# Patient Record
Sex: FEMALE | Race: WHITE
[De-identification: names, ages, dates, MRNs, and addresses within clinical notes are randomized per-mention and may not be internally consistent; named-entity substitution may affect disease eponyms.]

---

## 2017-09-01 ENCOUNTER — HOSPITAL ENCOUNTER (INPATIENT)
Dept: HOSPITAL 36 - ER | Age: 77
LOS: 3 days | Discharge: HOME | DRG: 100 | End: 2017-09-04
Attending: INTERNAL MEDICINE | Admitting: INTERNAL MEDICINE
Payer: MEDICARE

## 2017-09-01 VITALS — SYSTOLIC BLOOD PRESSURE: 143 MMHG | DIASTOLIC BLOOD PRESSURE: 57 MMHG

## 2017-09-01 DIAGNOSIS — Z87.440: ICD-10-CM

## 2017-09-01 DIAGNOSIS — I10: ICD-10-CM

## 2017-09-01 DIAGNOSIS — K21.9: ICD-10-CM

## 2017-09-01 DIAGNOSIS — K72.90: ICD-10-CM

## 2017-09-01 DIAGNOSIS — Z79.899: ICD-10-CM

## 2017-09-01 DIAGNOSIS — F03.90: ICD-10-CM

## 2017-09-01 DIAGNOSIS — M21.372: ICD-10-CM

## 2017-09-01 DIAGNOSIS — Z88.2: ICD-10-CM

## 2017-09-01 DIAGNOSIS — E78.5: ICD-10-CM

## 2017-09-01 DIAGNOSIS — I25.10: ICD-10-CM

## 2017-09-01 DIAGNOSIS — F29: ICD-10-CM

## 2017-09-01 DIAGNOSIS — J96.01: ICD-10-CM

## 2017-09-01 DIAGNOSIS — E66.2: ICD-10-CM

## 2017-09-01 DIAGNOSIS — J44.9: ICD-10-CM

## 2017-09-01 DIAGNOSIS — G20: ICD-10-CM

## 2017-09-01 DIAGNOSIS — E55.9: ICD-10-CM

## 2017-09-01 DIAGNOSIS — B35.1: ICD-10-CM

## 2017-09-01 DIAGNOSIS — I47.1: ICD-10-CM

## 2017-09-01 DIAGNOSIS — M19.90: ICD-10-CM

## 2017-09-01 DIAGNOSIS — M21.371: ICD-10-CM

## 2017-09-01 DIAGNOSIS — D50.9: ICD-10-CM

## 2017-09-01 DIAGNOSIS — G40.409: Primary | ICD-10-CM

## 2017-09-01 DIAGNOSIS — M62.81: ICD-10-CM

## 2017-09-01 DIAGNOSIS — F20.9: ICD-10-CM

## 2017-09-01 LAB
ALBUMIN/GLOB SERPL: 1.4 {RATIO} (ref 1–1.8)
ALP SERPL-CCNC: 77 U/L (ref 34–104)
ALT SERPL-CCNC: 12 U/L (ref 7–52)
ANION GAP SERPL CALC-SCNC: 14.3 MMOL/L (ref 7–16)
ARTERIAL PATENCY WRIST A: POSITIVE
AST SERPL-CCNC: 16 U/L (ref 13–39)
BASE EXCESS BLDA CALC-SCNC: 4.8 MEQ/L (ref -3–3)
BASOPHILS NFR BLD AUTO: 1.3 % (ref 0–2)
BILIRUB SERPL-MCNC: 0.3 MG/DL (ref 0.3–1)
BUN SERPL-MCNC: 18 MG/DL (ref 7–25)
BUN/CREAT SERPL: 25.7
CALCIUM SERPL-MCNC: 9.3 MG/DL (ref 8.6–10.3)
CHLORIDE SERPL-SCNC: 102 MEQ/L (ref 98–107)
CO2 SERPL-SCNC: 24.5 MEQ/L (ref 21–31)
CREAT SERPL-MCNC: 0.7 MG/DL (ref 0.6–1.2)
CRITICAL VALUES REPORTED BY: (no result)
EOSINOPHIL NFR BLD AUTO: 0.5 % (ref 0–5)
ERYTHROCYTE [DISTWIDTH] IN BLOOD BY AUTOMATED COUNT: 12.8 % (ref 11.5–20)
GLOBULIN SER-MCNC: 2.6 GM/DL
GLUCOSE SERPL-MCNC: 145 MG/DL (ref 70–105)
HCO3 BLDA-SCNC: 28.3 MEQ/L (ref 20–26)
HCT VFR BLD CALC: 44.2 % (ref 35–45)
HGB BLD-MCNC: 14.6 GM/DL (ref 11.7–16.1)
LYMPHOCYTES NFR BLD AUTO: 10.3 % (ref 20–50)
MCH RBC QN AUTO: 30.1 PG (ref 27–31)
MCHC RBC AUTO-ENTMCNC: 33 PG (ref 28–36)
MCV RBC AUTO: 91.2 FL (ref 81–100)
MONOCYTES NFR BLD AUTO: 3 % (ref 2–10)
NEUTROPHILS # BLD: 8.6 TH/CMM (ref 1.8–8)
NEUTROPHILS NFR BLD AUTO: 84.9 % (ref 40–80)
O2/TOTAL GAS SETTING VFR VENT: 21 %
PLATELET # BLD: 257 TH/CMM (ref 150–400)
PMV BLD AUTO: 8.5 FL
POTASSIUM SERPL-SCNC: 3.8 MEQ/L (ref 3.5–5.1)
RBC # BLD AUTO: 4.84 MIL/CMM (ref 3.8–5.2)
SODIUM SERPL-SCNC: 137 MEQ/L (ref 136–145)
WBC # BLD AUTO: 10.2 TH/CMM (ref 4.8–10.8)

## 2017-09-01 PROCEDURE — Z7610: HCPCS

## 2017-09-01 RX ADMIN — IPRATROPIUM BROMIDE AND ALBUTEROL SULFATE SCH ML: .5; 3 SOLUTION RESPIRATORY (INHALATION) at 19:24

## 2017-09-01 RX ADMIN — DEXTROSE AND SODIUM CHLORIDE SCH MLS/HR: 5; .45 INJECTION, SOLUTION INTRAVENOUS at 20:42

## 2017-09-01 NOTE — DIAGNOSTIC IMAGING REPORT
CHEST X-RAY: AP view



INDICATION: Shortness of breath



COMPARISON: 5/20/2015



FINDINGS: No focal consolidation or effusions. Chronic lung changes are

noted. Mild cardiomegaly is noted. Atherosclerosis is noted.

Degenerative changes of the spine and bilateral shoulders are noted.



IMPRESSION:



No focal consolidation or evidence of CHF.



Mild cardiomegaly with atherosclerosis.

## 2017-09-01 NOTE — CONSULTATION
DATE OF CONSULTATION:  09/01/2017



HISTORY OF PRESENT ILLNESS:  A 77-year-old female.  She is in nursing home. 

Seizure, tonic-clonic.  There are no other seizures while here.  There is a

history of seizures.  Last one about a year ago.  The patient on Depakote and

Keppra.  Depakote level very low.  The patient is lying in bed.



PAST MEDICAL HISTORY:  Seizures, dementia, Parkinson's, unclear whether the

primary or secondary to medications.



Hypertension, coronary artery disease, asthma, COPD.



MEDICATIONS:  Per reconciliation.



REVIEW OF SYSTEMS:  Twelve point negative except for above.



PHYSICAL EXAMINATION:

VITAL SIGNS:  Temperature 98.1, blood pressure 120/66, pulse is 84.

NECK:  Supple, no bruits.

HEART:  Sounds S1, S2.

LUNGS:  Clear.

NEUROLOGIC:  Awake, alert.  The patient answers questions, give me her name. but

not much more.  Pupils react to light.  She is able to name simple objects. 

Face immobile.

MOTOR:  She has drift right and left arm, but increased tone, rigidity,

cogwheeling.  Tremor, more on the right than left, resting and action.  Lower

extremity, not much movement.  The patient's reflexes about 1 ____ extremity,

difficult to get the knees and ankles.



IMPRESSION:

1.Seizure, with history of seizures.

2.Encephalopathy.

3.Schizophrenia.

4.Parkinson's.

5.Dementia.

6.Coronary artery disease.

7.Hypertension.



PLAN:  We will give IV Depakote and Keppra.  CT scan, EEG.





DD: 09/01/2017 20:11

DT: 09/01/2017 21:14

JOB# [id number]  [id number]

## 2017-09-01 NOTE — ED PHYSICIAN CHART
Chief Complaint/HPI





- Patient Information


Date Seen:: 09/01/17


Time Seen:: 09:46


Chief Complaint:: SEIZURES


History of Present Illness:: 





THIS IS A 78 YO FEMALE WHO WAS SENT FROM THE NURSING HOME FOR EVALUATION AND 

TREATMENT OF HER SEIZURES DISORDER.  SHE HAD A TONIC CLONIC SEIZURE THAT LAST 

FOR ABOUT 15 SECONDS JUST 1 HOUR PRIOR TO ARRIVAL HERE.  THE LAST SEIZURES SHE 

HAD WAS ABOUT A YEAR AGO. SHE IS CHRONICALLY ILL WITH COPD, CAD, OBESITY AND 

SCHIZOPHRENIA.  


Allergies:: 


 Allergies











Allergy/AdvReac Type Severity Reaction Status Date / Time


 


Sulfa (Sulfonamide AdvReac   Verified 09/01/17 09:37





Antibiotics)     











Vitals:: 


 Vital Signs - 8 hr











  09/01/17





  09:42


 


Temp 98.1 F


 


HR 84


 


RR 16


 


/66


 


O2 Sat % 96











Historian:: EMS, Medical Records


Review:: Nurse's Note Reviewed, Old Chart Reviewed, Transfer documents Reviewed





Review of Systems





- Review of Systems


General/Constitutional: No fever, No chills, No weight loss, No weakness, No 

diaphoresis, No edema, No loss of appetite, Other (THIS PATIENT IS UNABLE TO 

GIVE A REVIEW OF SYSTEMS.)


Skin: No skin lesions, No rash, No bruising


Head: No headache, No light-headedness


Eyes: No loss of vision, No pain, No diplopia


ENT: No earache, No nasal drainage, No sore throat, No tinnitus


Neck: No neck pain, No swelling, No thyromegaly, No stiffness, No mass noted


Cardio Vascular: No chest pain, No palpitations, No PND, No orthopnea, No edema


Pulmonary: No SOB, No cough, No sputum, No wheezing


GI: No nausea, No vomiting, No diarrhea, No pain, No melena, No hematochezia, 

No constipation, No hematemesis


G/U: No dysuria, No frequency, No hematuria


Musculoskeletal: No bone or joint pain, No back pain, No muscle pain


Endocrine: No polyuria, No polydipsia


Psychiatric: No prior psych history, No depression, No anxiety, No suicidal 

ideation


Hematopoietic: No bruising, No lymphadenopathy


Allergic/Immuno: No urticaria, No angioedema


Neurological: No syncope, No focal symptoms, No weakness, No paresthesia, No 

headache, No seizure, No dizziness, No confusion, No vertigo





Past Medical History





- Past Medical History


Obtainable: Yes


Past Medical History: HTN, CAD, Asthma/COPD, Seizures, Dementia, Other (

PARKINSONS'S)


Family History: None


Social History: Non Smoker, No Alcohol, No Drug Use, Care Facility


Surgical History: None


Psychiatricy History: Schizophrenia


Medication: Reviewed





Family Medical History





- Family Member


  ** Mother


History Unknown: Yes





Physical Exam





- Physical Examination


General/Constitutional: Awake, Well-developed, well-nourished, Alert, No 

distress, GCS 15, Non-toxic appearing, Ambulatory


Other Gen/Cons comments:: 





DISORIENTED TIMES FOUR


Head: Atraumatic


Eyes: Lids, conjuctiva normal, PERRL, EOMI


Skin: Nl inspection, No rash, No skin lesions, No ecchymosis, Well hydrated, No 

lymphadenopathy


ENMT: External ears, nose nl, Nasal exam nl, Lips, teeth, gums nl


Neck: Nontender, Full ROM w/o pain, No JVD, No nuchal rigidity, No bruit, No 

mass, No stridor


Respiratory: Nl effort/Exclusion, Clear to Auscultation, No Wheeze/Rhonchi/Rales


Cardio Vascular: RRR, No murmur, gallop, rubs, NL S1 S2


GI: No tenderness/rebounding/guarding, No organomegaly, No hernia, Normal BS's, 

Nondistended, No mass/bruits, No McBurney tenderness


: No CVA tenderness


Extremities: No tenderness or effusion, Full ROM, normal strength in all 

extremities, No edema, Normal digits & nails


Neuro/Psych: DTR's symmetric, Normal sensory exam, Normal motor strength, 

Judgement/insight normal, Normal gait, No focal deficits


Other Neuro/Psych comments:: 





SHE IS DISORIENTED AND LETHARGIC


Misc: normal gait, Normal back, No paraspinal tenderness





Labs/Radiology/EKG Results





- Lab Results


Results: 





 Abnormal Lab Results











  09/01/17 09/01/17 09/01/17





  10:00 10:00 10:00


 


WBC  10.2  D  


 


RBC  4.84  


 


Hgb  14.6  D  


 


Hct  44.2  D  


 


MCV  91.2  


 


MCH  30.1  


 


MCHC Differential  33.0  


 


RDW  12.8  


 


Plt Count  257  


 


MPV  8.5  


 


Neutrophils %  84.9 H  


 


Lymphocytes %  10.3 L  


 


Monocytes %  3.0  


 


Eosinophils %  0.5  


 


Basophils %  1.3  


 


Specimen Source   


 


Sample Site   


 


pH   


 


pCO2   


 


pO2   


 


HCO3   


 


Base Excess   


 


O2 Saturation   


 


Ariel Test   


 


Vent Rate   


 


Inspired O2   


 


Tidal Volume   


 


PEEP   


 


Pressure (ins/psv/peep)   


 


Critical Value   


 


Sodium   137 


 


Potassium   3.8 


 


Chloride   102 


 


Carbon Dioxide   24.5 


 


Anion Gap   14.3 


 


BUN   18 


 


Creatinine   0.7 


 


Est GFR ( Amer)   TNP 


 


Est GFR (Non-Af Amer)   TNP 


 


BUN/Creatinine Ratio   25.7 


 


Glucose   145 H 


 


Calcium   9.3 


 


Total Bilirubin   0.3 


 


AST   16 


 


ALT   12 


 


Alkaline Phosphatase   77 


 


Total Protein   6.3 


 


Albumin   3.7 


 


Globulin   2.6 


 


Albumin/Globulin Ratio   1.4 


 


Valproic Acid    < 10.0 L














  09/01/17





  10:10


 


WBC 


 


RBC 


 


Hgb 


 


Hct 


 


MCV 


 


MCH 


 


MCHC Differential 


 


RDW 


 


Plt Count 


 


MPV 


 


Neutrophils % 


 


Lymphocytes % 


 


Monocytes % 


 


Eosinophils % 


 


Basophils % 


 


Specimen Source  Arterial


 


Sample Site  Right Radial


 


pH  7.36


 


pCO2  56.0 H*


 


pO2  48.0 L*


 


HCO3  28.3 H


 


Base Excess  4.8 H


 


O2 Saturation  82.0 L


 


Ariel Test  POSITIVE


 


Vent Rate  N/A


 


Inspired O2  21


 


Tidal Volume  N/A


 


PEEP  N/A


 


Pressure (ins/psv/peep)  N/A


 


Critical Value  ISAIAH


 


Sodium 


 


Potassium 


 


Chloride 


 


Carbon Dioxide 


 


Anion Gap 


 


BUN 


 


Creatinine 


 


Est GFR ( Amer) 


 


Est GFR (Non-Af Amer) 


 


BUN/Creatinine Ratio 


 


Glucose 


 


Calcium 


 


Total Bilirubin 


 


AST 


 


ALT 


 


Alkaline Phosphatase 


 


Total Protein 


 


Albumin 


 


Globulin 


 


Albumin/Globulin Ratio 


 


Valproic Acid 














- Radiology Results


Results: 





CHEST X-RAY = NAD





- EKG Interpretations


EKG Time:: 09:54


Rate & Rhythm: RATE=92 NSR


Axis: LEFT





Assessment





- Assessment


General Assessment: 





SEIZURE DISORDER


HYPOXEMIA





ED Septic Shock





- .


Is Septic Shock (SBP<90, OR Lactate>4 mmol\L) present?: No





- <6hrs of presentation:


Vital Signs: 


 Vital Signs - 8 hr











  09/01/17





  09:42


 


Temp 98.1 F


 


HR 84


 


RR 16


 


/66


 


O2 Sat % 96














Reassessment (Disposition)





- Diagnosis


Diagnosis:: 





SEIZURES


HYPOXEMIA


SCHIZOPHRENIA





- Patient Disposition


Discharge/Transfer:: Acute Care w/in this hosp


Admitted to:: Telemetry


Admitting Medical Physician:: Oliver Elizabeth


Condition at Disposition:: Improved





ED Discharge Plan





- Patient Disposition


Admit/Discharge/Transfer: Acute Care w/in this hosp


Condition at Disposition: Improved

## 2017-09-01 NOTE — HISTORY & PHYSICAL
ADMIT DATE:  09/01/2017



PATIENT IDENTIFICATION:  A 77-year-old female.



CHIEF COMPLAINT:  Brought in to the Emergency Room for altered mental status and

seizure evaluation.



HISTORY SOURCE:  Reviewing the chart, talking to the Emergency Room MD as well

as ICU nurse Allison.



HISTORY OF PRESENT ILLNESS:  A 77-year-old  female.  She resides at

[facility] under the care of primary care doctor, Dr. Posada,

was brought in to the Emergency Room for evaluation of altered mental status

with the seizure.  According to the chart, the patient does have history of

seizure disorder and she is supposed to take Depakote as well as the Keppra. 

Depakote level here in the hospital Emergency Room was reported less than 10. 

The patient was given p.o. Depakote here in the Emergency Room, but subsequently

the patient was admitted to telemetry unit where the patient had another

seizure.  The patient was transferred to ICU for altered mental status and

seizure and the patient was hypoxic.  Upon my arrival, the patient came back

from her postictal phase and started talking.  According to the nursing staff,

the patient had a fever of 100.6.  The patient is alert, awake, and trying to

appropriately answer the questions.



PAST MEDICAL HISTORY:  Remarkable for;

1.  COPD.

2.  Seizure.

3.  Hypertension.

4.  Obesity.

5.  Gastroesophageal reflux disease.

6.  Hyper ammonia level.

7.  Psychotic disorder.

8.  Urinary tract infection.



MEDICATIONS:  At the nursing home, the patient is taking multiple medications,

which include metoprolol, ____, Keppra, Depakote, Colace, clonidine, vitamin D,

atorvastatin, milk of magnesia, Tylenol.



ALLERGIES:  The patient is allergic to SULFA.



SOCIAL HISTORY:  The patient lives currently in a nursing home.  The patient has

no smoking, alcohol or drug use.



FAMILY HISTORY:  Unavailable.



REVIEW OF SYSTEMS:  The patient denies any headache.  Denies any chest pain,

denies any shortness of breath, denies any abdominal pain.  Denies any

hematemesis, hematuria, hematochezia, or melena.  The patient denies any cough.



PHYSICAL EXAMINATION:

GENERAL:  The patient is alert, awake, lying in the bed, following commands.

VITAL SIGNS:  Temperature 100.1, pulse is 120, respiratory rate 23, blood

pressure 150/73.

HEENT:  Normocephalic, atraumatic.  Extraocular muscles are intact.  Tongue was

pink and coated.  Oropharynx congested.  Nasal mucosa congested.

NECK:  Supple.  No JVD, no hepatojugular reflex.  No lymphadenopathy,

thyromegaly or carotid bruit.

HEART:  Both heart sounds are regular, tachycardia noted.

CHEST AND LUNGS:  Equal in expansion with mild expiratory wheezing.

ABDOMEN:  Protuberant, soft.  No guarding, rigidity.  Bowel sounds are present.

EXTREMITIES:  Bilateral foot drop noted with no calf tenderness.

NEUROLOGIC:  Alert, awake, follows commands.  Decreased power on the lower

extremity noted which includes flexion, extension of proximal and distal muscles

and no movement on both feet noted.  Upper extremities, the patient is moving

fairly well.



AVAILABLE DIAGNOSTIC DATA:  Performed in the Emergency Room, which include white

count of 10.2, hemoglobin 14.6, platelet count of 257, pH of 7.36, pCO2 of 56,

pO2 of 48, bicarbonate of 28.3.  BUN and creatinine are 18 and 0.7. 

Electrolytes are normal.  Random blood sugar 145.  Depakote level is less than

10, liver functions are normal.



CLINICAL IMPRESSION:

1.  Most likely breakthrough seizure.

2.  Hypoxia, secondary to central etiology and possible obstructive sleep apnea,

obesity hypoventilation syndrome.

3.  Hypertension.

4.  Breakthrough seizure secondary to subtherapeutic Depakote and Keppra level.

5.  Hepatic encephalopathy by history.

6.  Psychotic disorder.

7.  Gastroesophageal reflux disease.

8.  Morbid obesity.

9.  Chronic obstructive pulmonary disease.



PLAN:  The patient is admitted at this time to ICU, oxygen, nebulizer treatment

will be added, empirically IV Rocephin will be added.  Neurology consultation

will be requested, continue to provide seizure precautions along with IV

Depakote and Keppra for now.  At also get the baseline EEG as well.  The patient

was noted to have heart rate of 125.  The patient was seen by cardiologist, will

follow his recommendation as well.  Cardiac enzymes will be done.  Neuro check

will be done as well as for follow up lab.  Care plan has been reviewed and

discussed with the patient and the RN.





DD: 09/01/2017 17:24

DT: 09/01/2017 19:54

JOB# [id number]  [id number]

## 2017-09-02 LAB
ALBUMIN/GLOB SERPL: 1.5 {RATIO} (ref 1–1.8)
ALP SERPL-CCNC: 55 U/L (ref 34–104)
ALT SERPL-CCNC: 10 U/L (ref 7–52)
ANION GAP SERPL CALC-SCNC: 6.6 MMOL/L (ref 7–16)
AST SERPL-CCNC: 12 U/L (ref 13–39)
BILIRUB SERPL-MCNC: 0.5 MG/DL (ref 0.3–1)
BUN SERPL-MCNC: 11 MG/DL (ref 7–25)
BUN/CREAT SERPL: 15.7
CALCIUM SERPL-MCNC: 8.7 MG/DL (ref 8.6–10.3)
CHLORIDE SERPL-SCNC: 103 MEQ/L (ref 98–107)
CHOLEST SERPL-MCNC: 144 MG/DL (ref ?–200)
CO2 SERPL-SCNC: 31.3 MEQ/L (ref 21–31)
CREAT SERPL-MCNC: 0.7 MG/DL (ref 0.6–1.2)
ERYTHROCYTE [DISTWIDTH] IN BLOOD BY AUTOMATED COUNT: 12.5 % (ref 11.5–20)
GLOBULIN SER-MCNC: 2.1 GM/DL
GLUCOSE SERPL-MCNC: 105 MG/DL (ref 70–105)
HCT VFR BLD CALC: 37.6 % (ref 35–45)
HGB BLD-MCNC: 12.4 GM/DL (ref 11.7–16.1)
MCH RBC QN AUTO: 30.2 PG (ref 27–31)
MCHC RBC AUTO-ENTMCNC: 33.1 PG (ref 28–36)
MCV RBC AUTO: 91.3 FL (ref 81–100)
NEUTROPHILS NFR BLD AUTO: 81 % (ref 40–80)
PLAT MORPH BLD: NORMAL
PLATELET # BLD EST: ADEQUATE 10*3/UL
PLATELET # BLD: 221 TH/CMM (ref 150–400)
PMV BLD AUTO: 8.3 FL
POTASSIUM SERPL-SCNC: 3.9 MEQ/L (ref 3.5–5.1)
RBC # BLD AUTO: 4.11 MIL/CMM (ref 3.8–5.2)
RBC MORPH BLD: NORMAL
SODIUM SERPL-SCNC: 137 MEQ/L (ref 136–145)
TOTAL CELLS COUNTED BLD: 100
TRIGL SERPL-MCNC: 78 MG/DL (ref ?–150)
WBC # BLD AUTO: 12.2 TH/CMM (ref 4.8–10.8)

## 2017-09-02 RX ADMIN — IPRATROPIUM BROMIDE AND ALBUTEROL SULFATE SCH ML: .5; 3 SOLUTION RESPIRATORY (INHALATION) at 18:41

## 2017-09-02 RX ADMIN — LEVETIRACETAM SCH MLS/HR: 10 INJECTION INTRAVENOUS at 15:58

## 2017-09-02 RX ADMIN — NYSTATIN SCH UNITS: 100000 POWDER TOPICAL at 10:45

## 2017-09-02 RX ADMIN — DEXTROSE AND SODIUM CHLORIDE SCH MLS/HR: 5; .45 INJECTION, SOLUTION INTRAVENOUS at 15:59

## 2017-09-02 RX ADMIN — IPRATROPIUM BROMIDE AND ALBUTEROL SULFATE SCH ML: .5; 3 SOLUTION RESPIRATORY (INHALATION) at 12:09

## 2017-09-02 RX ADMIN — IPRATROPIUM BROMIDE AND ALBUTEROL SULFATE SCH: .5; 3 SOLUTION RESPIRATORY (INHALATION) at 07:51

## 2017-09-02 RX ADMIN — IPRATROPIUM BROMIDE AND ALBUTEROL SULFATE SCH ML: .5; 3 SOLUTION RESPIRATORY (INHALATION) at 01:46

## 2017-09-02 RX ADMIN — NYSTATIN SCH UNITS: 100000 POWDER TOPICAL at 18:30

## 2017-09-02 RX ADMIN — LEVETIRACETAM SCH MLS/HR: 10 INJECTION INTRAVENOUS at 03:50

## 2017-09-02 NOTE — CONSULTATION
DATE OF CONSULTATION:  09/01/2017



The patient of Dr. Elizabeth.



HISTORY AND PHYSICAL:  This 77-year-old  female patient, who was

transferred from [facility] because of altered level of consciousness as

well as seizure activity.  The patient was admitted to telemetry bed where the

patient had repeat seizures and the patient was transferred to the intensive

care unit.  The patient had supraventricular tachycardia.  The patient was

started on Cardizem drip.



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

obesity, COPD, Parkinson's disease, hypertension, iron deficiency anemia,

hyperlipidemia, vitamin D deficiency, onychomycosis of the toenails,

schizophrenia, hepatic encephalopathy.



FAMILY HISTORY:  Unremarkable.



SOCIAL HISTORY:  No history of smoking, alcohol abuse.



ALLERGIES:  No known allergies.



PHYSICAL EXAMINATION:

VITAL SIGNS:  Blood pressure 130/82, pulse 120 regular, respirations 28.

HEAD:  Normocephalic.  No lumps or bumps.

EYES:  Pupils are equal, reactive to light.  Fundi show AV nicking, sclerae

white, conjunctivae pink.

NECK:  Carotid 2+.  Normal upstroke.  JVD flat.  Thyroid not palpable.  Lymph

nodes not palpable.

CHEST:  Shows increased AP diameter.  No kyphosis, scoliosis.

LUNGS:  Bilateral bronchovesicular breath sounds.

HEART:  PMI fifth intercostal space with lateral to midclavicular line.  S1, S2.

 No S3, S4.  Systolic murmur, grade 2/6, lower left sternal border without

radiation.

ABDOMEN:  Soft.  Liver and spleen are not palpable.  No organomegaly.  Bowel

sounds are active.

NEUROLOGIC:  Seizure activity.

EXTREMITIES:  Peripheral pulses 2+.  No pedal edema.



CLINICAL IMPRESSION:  Grand mal seizures, supraventricular tachycardia, hepatic

encephalopathy, morbid obesity, chronic obstructive pulmonary disease,

Parkinson's disease, hypertension, iron deficiency anemia, hyperlipidemia,

vitamin D deficiency, onychomycosis of toenails, schizophrenia.



PLAN:  The patient will continue on IV Ativan, neurology consult, and start the

patient on Cardizem drip, also get an echocardiogram.





DD: 09/01/2017 22:41

DT: 09/02/2017 01:11

JOB# [id number]  [id number]

## 2017-09-02 NOTE — DIAGNOSTIC IMAGING REPORT
Head CT without intravenous contrast



Indication: CVA



Comparison: 3/24/2015 



Technique: Axial images were obtained from the vertex to the skull base

without IV contrast. Coronal reconstructions were made.   Total DLP:

703,  [id number]



FINDINGS:



Images of the brain obtained without contrast demonstrate no acute

hemorrhage. No mass lesions identified. The ventricles and basal

cisterns are patent.  The gray-white matter differentiation is

preserved. There is no mass effect or midline shift. Atrophy is noted.

Atherosclerosis is noted.



No skull fractures identified. No soft tissue swelling. The paranasal

sinuses are clear.



IMPRESSION:



No CT evidence of acute intracranial abnormality. Clinically indicated a

follow-up MRI may also be obtained



Atrophy.



Atherosclerotic vascular disease.

## 2017-09-03 RX ADMIN — IPRATROPIUM BROMIDE AND ALBUTEROL SULFATE SCH: .5; 3 SOLUTION RESPIRATORY (INHALATION) at 13:54

## 2017-09-03 RX ADMIN — IPRATROPIUM BROMIDE AND ALBUTEROL SULFATE SCH ML: .5; 3 SOLUTION RESPIRATORY (INHALATION) at 06:45

## 2017-09-03 RX ADMIN — IPRATROPIUM BROMIDE AND ALBUTEROL SULFATE SCH ML: .5; 3 SOLUTION RESPIRATORY (INHALATION) at 18:51

## 2017-09-03 RX ADMIN — NYSTATIN SCH UNITS: 100000 POWDER TOPICAL at 18:22

## 2017-09-03 RX ADMIN — DEXTROSE AND SODIUM CHLORIDE SCH MLS/HR: 5; .45 INJECTION, SOLUTION INTRAVENOUS at 18:21

## 2017-09-03 RX ADMIN — IPRATROPIUM BROMIDE AND ALBUTEROL SULFATE SCH: .5; 3 SOLUTION RESPIRATORY (INHALATION) at 01:11

## 2017-09-03 RX ADMIN — LEVETIRACETAM SCH MLS/HR: 10 INJECTION INTRAVENOUS at 03:01

## 2017-09-03 RX ADMIN — NYSTATIN SCH UNITS: 100000 POWDER TOPICAL at 13:01

## 2017-09-04 RX ADMIN — IPRATROPIUM BROMIDE AND ALBUTEROL SULFATE SCH ML: .5; 3 SOLUTION RESPIRATORY (INHALATION) at 13:54

## 2017-09-04 RX ADMIN — IPRATROPIUM BROMIDE AND ALBUTEROL SULFATE SCH ML: .5; 3 SOLUTION RESPIRATORY (INHALATION) at 07:23

## 2017-09-04 RX ADMIN — NYSTATIN SCH: 100000 POWDER TOPICAL at 09:07

## 2017-09-04 RX ADMIN — IPRATROPIUM BROMIDE AND ALBUTEROL SULFATE SCH ML: .5; 3 SOLUTION RESPIRATORY (INHALATION) at 01:11

## 2017-09-04 NOTE — DISCHARGE SUMMARY
DATE OF DISCHARGE:  09/04/2017



IDENTIFICATION:  A 77-year-old female.



PRINCIPAL DIAGNOSES:

1.  Altered mental status secondary to breakthrough seizure.

2.  Hypoxia, most resolved, suspected probably from seizure causing her to have

hypoventilation syndrome due to her obesity.

3.  Hypertension.

4.  Obstructive sleep apnea.

5.  Psychotic disorder.

6.  Gastroesophageal reflux disease.

7.  Chronic obstructive pulmonary disease.

8.  History of hepatic encephalopathy.

9.  Supraventricular tachycardia.



BRIEF TREATMENT FOR THE REASON FOR ADMISSION:  A 77-year-old female who

presented to the Emergency Room for altered mental status and seizure

evaluation.  When the patient was in the ER, the patient was also noted to have

significant hypoxia.  The patient was evaluated and subsequently admitted to

ICU.  Please refer to my dictated medical H and P for further information.



HOSPITAL COURSE:  The patient was admitted to ICU.  The patient was given

oxygen, nebulizer treatment along with empiric IV antibiotics.  Seizure

medication levels were checked.  IV Depakene and Keppra were given.  The patient

had Neurology and Cardiology evaluation since patient's heart rate was 130 as

well.  The patient was noted to have SVT by cardiologist.  Cardiac enzymes were

done, which were negative.  The patient did respond fairly well to IV Depakene

and Keppra.  The patient's mental status improved significantly.  EEG were done,

which was unremarkable for any abnormal activity.  CT scan of the head was done

on 09/02/2017, which was read by radiologist.  No CT evidence of any acute

intracranial abnormality, atrophy or atherosclerotic vascular disease was

reported.  The patient was stabilized and transferred to telemetry and from

telemetry to Med/Surg floor.  There was no reported seizures were given.  IV

Depakene and IV Keppra were switched to p.o. by mouth.  The patient's other

medications were continued ____ the patient was receiving.  The patient remained

hemodynamically stable and no further seizure activity was reported.  Decision

was made that the patient is to be discharged back to [facility].  The patient is discharged to [facility] in stable

condition where the patient will be followed by her primary care MD as well.





DD: 09/04/2017 12:49

DT: 09/04/2017 13:34

JOB# [id number]  [id number]

## 2017-09-06 NOTE — ADMIT CRITERIA FORM
Admit Criteria Forms





- Admit Criteria


Diagnosis: 





                                              SEIZURE





Clinical Indications for Admission to Inpatient Care





                                                         (Place 'X' for any and 

all applicable criteria): 


   


Admission is indicated for seizure and 1 or more of the following (1)(2)(3)(4)(5

)(6) 


[ X]I.    Inpatient admission required rather than observation care (Also use 

Seizure: Observation 


        Care Criteria as appropriate) because of 1 or more of the following:   

      


               [ ]1)   Altered mental status that is severe or persistent


               [ ]2)   New focal neurologic deficit that is severe or persistent


               [ ]3)   Metabolic disorder (eg, hypoglycemia, hyponatremia) that 

is severe or persistent


               [ ]4)   Recurrent seizure


               [X ]5)   Outpatient antiseizure regimen cannot be established (eg

, patient cannot tolerate 


                        medication, initiation requires inpatient care)


               [X ]6)   Need for ongoing intravenous infusion of anti-seizure 

medication 


               [ ]7)   Cerebral bleeding, hydrocephalus, or vasospasm 

monitoring  


               [ ]8)       Increased intracranial pressure or cerebral edema 

monitoring 


               [ ]9)    Other conditions, treatment or monitoring requiring 

inpatient admission 


[ ]II.   Status epilepticus [A] or repetitive seizures not controlled with 

emergent treatment (6)(8)


[ ]III.  Brain disorder (eg, tumor, edema, and hydrocephalus) that requiring 

monitoring or intervention 


         available only at inpatient level of care.


[ ]IV.  Brain insult (eg, severe trauma, stroke, drug toxicity, or withdrawal) 

that requires monitoring 


         or intervention available only at inpatient level of care (10)(11)


[ ]V.   Cardiac arrhythmias of immediate concern 











Extended stay beyond goal length of stay may be needed for (22)


[ ]a)   Complications of status epilepticus


[ ]b)   Refractory status epilepticus


[ ]c)   Etiology-specific therapy for conditions such as CNS infection, head 

injury,eclampsia, 


         severe metabolic abnormalities, and brain tumor


[ ]d)   Residual neurologic damage,


[ ]e)   Initiation of significant change to anticonvulsant treatment 


[ ]f)    Older patients (65 years or older)


[ ]g)   Patient requiring intubation (eg, to protect airway)











The original [facility] content created by [facility] [employer] has been revised. 


The portions of the content which have been revised are identified through the 

use of italic text or in bold, and [facility] [employer] 


has neither reviewed nor approved the modified material. All other unmodified 

content is copyright  [facility].





Please see references footnoted in the original [facility] edition 

2017


Admit Criteria Met?: Yes

## 2019-05-29 ENCOUNTER — HOSPITAL ENCOUNTER (INPATIENT)
Dept: HOSPITAL 36 - ER | Age: 79
LOS: 3 days | Discharge: NURSING, REHAB OR LONG-TERM CARE | DRG: 101 | End: 2019-06-01
Attending: FAMILY MEDICINE | Admitting: FAMILY MEDICINE
Payer: MEDICARE

## 2019-05-29 VITALS — SYSTOLIC BLOOD PRESSURE: 145 MMHG | DIASTOLIC BLOOD PRESSURE: 54 MMHG

## 2019-05-29 DIAGNOSIS — G40.909: Primary | ICD-10-CM

## 2019-05-29 DIAGNOSIS — F02.80: ICD-10-CM

## 2019-05-29 DIAGNOSIS — Z88.2: ICD-10-CM

## 2019-05-29 DIAGNOSIS — G47.33: ICD-10-CM

## 2019-05-29 DIAGNOSIS — E78.5: ICD-10-CM

## 2019-05-29 DIAGNOSIS — D72.829: ICD-10-CM

## 2019-05-29 DIAGNOSIS — F29: ICD-10-CM

## 2019-05-29 DIAGNOSIS — G20: ICD-10-CM

## 2019-05-29 LAB
ALBUMIN SERPL-MCNC: 4 GM/DL (ref 3.7–5.3)
ALBUMIN/GLOB SERPL: 1.7 {RATIO} (ref 1–1.8)
ALP SERPL-CCNC: 67 U/L (ref 34–104)
ALT SERPL-CCNC: 19 U/L (ref 7–52)
ANION GAP SERPL CALC-SCNC: 16.8 MMOL/L (ref 7–16)
AST SERPL-CCNC: 20 U/L (ref 13–39)
BASOPHILS # BLD AUTO: 0 TH/CUMM (ref 0–0.2)
BASOPHILS NFR BLD AUTO: 0 % (ref 0–2)
BILIRUB SERPL-MCNC: 0.4 MG/DL (ref 0.3–1)
BUN SERPL-MCNC: 15 MG/DL (ref 7–25)
CALCIUM SERPL-MCNC: 9.3 MG/DL (ref 8.6–10.3)
CHLORIDE SERPL-SCNC: 103 MEQ/L (ref 98–107)
CK SERPL-CCNC: 50 U/L (ref 30–223)
CO2 SERPL-SCNC: 24.7 MEQ/L (ref 21–31)
CREAT SERPL-MCNC: 0.8 MG/DL (ref 0.6–1.2)
EOSINOPHIL # BLD AUTO: 0 TH/CMM (ref 0.1–0.4)
EOSINOPHIL NFR BLD AUTO: 0.3 % (ref 0–5)
ERYTHROCYTE [DISTWIDTH] IN BLOOD BY AUTOMATED COUNT: 13.8 % (ref 11.5–20)
GLOBULIN SER-MCNC: 2.4 GM/DL
GLUCOSE SERPL-MCNC: 161 MG/DL (ref 70–105)
HCT VFR BLD CALC: 45 % (ref 41–60)
HGB BLD-MCNC: 14.8 GM/DL (ref 12–16)
LYMPHOCYTE AB SER FC-ACNC: 0.6 TH/CMM (ref 1.5–3)
LYMPHOCYTES NFR BLD AUTO: 6.8 % (ref 20–50)
MCH RBC QN AUTO: 29.1 PG (ref 27–31)
MCHC RBC AUTO-ENTMCNC: 33 PG (ref 28–36)
MCV RBC AUTO: 88.2 FL (ref 81–100)
MONOCYTES # BLD AUTO: 0.4 TH/CMM (ref 0.3–1)
MONOCYTES NFR BLD AUTO: 4.1 % (ref 2–10)
NEUTROPHILS # BLD: 8.1 TH/CMM (ref 1.8–8)
NEUTROPHILS NFR BLD AUTO: 88.8 % (ref 40–80)
PLATELET # BLD: 286 TH/CMM (ref 150–400)
PMV BLD AUTO: 8.2 FL
POTASSIUM SERPL-SCNC: 4.5 MEQ/L (ref 3.5–5.1)
RBC # BLD AUTO: 5.1 MIL/CMM (ref 3.8–5.2)
SODIUM SERPL-SCNC: 140 MEQ/L (ref 136–145)
WBC # BLD AUTO: 9.1 TH/CMM (ref 4.8–10.8)

## 2019-05-29 PROCEDURE — Z7610: HCPCS

## 2019-05-29 NOTE — ED PHYSICIAN CHART
ED Chief Complaint/HPI





- Patient Information


Date Seen:: 19


Time Seen:: 19:38


Chief Complaint:: possible seizure 


History of Present Illness:: 





79 yr old female with multiple med problemsincluding seizure here for possible 

seizure pt awake alert verbal following commands 


Allergies:: 


 Allergies











Allergy/AdvReac Type Severity Reaction Status Date / Time


 


Sulfa (Sulfonamide AdvReac   Verified 17 09:37





Antibiotics)     











Vitals:: 


 Vital Signs - 8 hr











  19





  19:09


 


Temp 98.9 F


 





 


RR 18


 


/75


 


O2 Sat % 87














ED Review of Systems





- Review of Systems


General/Constitutional: No fever, No chills, No weight loss, No weakness, No 

diaphoresis, No edema, No loss of appetite


Skin: No skin lesions, No rash, No bruising


Head: No headache, No light-headedness


Eyes: No loss of vision, No pain, No diplopia


ENT: No earache, No nasal drainage, No sore throat, No tinnitus


Neck: No neck pain, No swelling, No thyromegaly, No stiffness, No mass noted


Cardio Vascular: No chest pain, No palpitations, No PND, No orthopnea, No edema


Pulmonary: No SOB, No cough, No sputum, No wheezing


GI: No nausea, No vomiting, No diarrhea, No pain, No melena, No hematochezia, 

No constipation, No hematemesis


G/U: No dysuria, No frequency, No hematuria


Musculoskeletal: Other (flaccid deformity lower feet)


Endocrine: No polyuria, No polydipsia


Psychiatric: No prior psych history, No depression, No anxiety, No suicidal 

ideation


Hematopoietic: No bruising, No lymphadenopathy


Allergic/Immuno: No urticaria, No angioedema


Neurological: No syncope, No focal symptoms, No weakness, No paresthesia, No 

headache, No seizure, No dizziness, No confusion, No vertigo





ED Past Medical History





- Past Medical History


Past Medical History: Other (see nurses notes)





Family Medical History





- Family Member


  ** Mother


History Unknown: Yes


Ethnicity: Non-


Living Status: 





ED Physical Exam





- Physical Examination


General/Constitutional: Awake


Head: Atraumatic


Eyes: Lids, conjuctiva normal


Skin: No skin lesions


Neck: Nontender


Respiratory: Nl effort/Exclusion


Cardio Vascular: RRR


GI: No tenderness/rebounding/guarding, No organomegaly


Other Extremities comments:: 





flaccid paralysis feet


Neuro/Psych: Alert/oriented





ED Septic Shock





- .


Is Septic Shock (SBP<90, OR Lactate>4 mmol\L) present?: No





- <6hrs of presentation:


Vital Signs: 


 Vital Signs - 8 hr











  19





  19:09


 


Temp 98.9 F


 





 


RR 18


 


/75


 


O2 Sat % 87














ED Reassessment (Disposition)





- Reassessment


Reassessment:: 





seizures





- Diagnosis


Diagnosis:: 





as above





- Patient Disposition


Discharge/Transfer:: Acute Care w/in this hosp


Admitted to:: Med/Surg


Condition at Disposition:: Stable

## 2019-05-30 LAB
ALBUMIN SERPL-MCNC: 3.7 GM/DL (ref 3.7–5.3)
ALBUMIN/GLOB SERPL: 1.5 {RATIO} (ref 1–1.8)
ALP SERPL-CCNC: 70 U/L (ref 34–104)
ALT SERPL-CCNC: 15 U/L (ref 7–52)
ANION GAP SERPL CALC-SCNC: 12.4 MMOL/L (ref 7–16)
AST SERPL-CCNC: 14 U/L (ref 13–39)
BASOPHILS NFR BLD: 0 % (ref 0–3)
BILIRUB SERPL-MCNC: 0.4 MG/DL (ref 0.3–1)
BUN SERPL-MCNC: 15 MG/DL (ref 7–25)
CALCIUM SERPL-MCNC: 9.3 MG/DL (ref 8.6–10.3)
CHLORIDE SERPL-SCNC: 103 MEQ/L (ref 98–107)
CO2 SERPL-SCNC: 27.2 MEQ/L (ref 21–31)
CREAT SERPL-MCNC: 0.8 MG/DL (ref 0.6–1.2)
EOSINOPHIL NFR BLD: 0 % (ref 0–5)
ERYTHROCYTE [DISTWIDTH] IN BLOOD BY AUTOMATED COUNT: 14 % (ref 11.5–20)
GLOBULIN SER-MCNC: 2.5 GM/DL
GLUCOSE SERPL-MCNC: 107 MG/DL (ref 70–105)
HCT VFR BLD CALC: 42.2 % (ref 41–60)
HGB BLD-MCNC: 14.2 GM/DL (ref 12–16)
LYMPHOCYTES # BLD MANUAL: 14 % (ref 20–50)
MCH RBC QN AUTO: 29.5 PG (ref 27–31)
MCHC RBC AUTO-ENTMCNC: 33.6 PG (ref 28–36)
MCV RBC AUTO: 88 FL (ref 81–100)
MONOCYTES # BLD MANUAL: 4 % (ref 2–10)
NEUTROPHILS NFR BLD AUTO: 80 % (ref 40–80)
NEUTS BAND NFR BLD: 2 % (ref 0–10)
PLATELET # BLD: 294 TH/CMM (ref 150–400)
PMV BLD AUTO: 9.1 FL
POTASSIUM SERPL-SCNC: 3.6 MEQ/L (ref 3.5–5.1)
RBC # BLD AUTO: 4.79 MIL/CMM (ref 3.8–5.2)
SODIUM SERPL-SCNC: 139 MEQ/L (ref 136–145)
WBC # BLD AUTO: 13.2 TH/CMM (ref 4.8–10.8)

## 2019-05-30 RX ADMIN — ASPIRIN 81 MG SCH: 81 TABLET ORAL at 09:51

## 2019-05-30 RX ADMIN — ASPIRIN 81 MG SCH MG: 81 TABLET ORAL at 10:22

## 2019-05-30 RX ADMIN — ASPIRIN 81 MG SCH MG: 81 TABLET ORAL at 08:50

## 2019-05-30 NOTE — HISTORY & PHYSICAL
ADMIT DATE:  05/30/2019



CHIEF COMPLAINT:  Possible seizure.



HISTORY OF PRESENT ILLNESS:  A 79-year-old female who is a nursing home resident

of [facility] admitted here to the telemetry unit due to possible seizure. 

No reports of any fevers at the nursing home.



PAST MEDICAL HISTORY:  Hypercholesterolemia, psychosis, dementia.



PAST SURGICAL HISTORY:  Unknown.



ALLERGIES:  SULFA.



FAMILY HISTORY:  Noncontributory.



SOCIAL HISTORY:  The patient is a nursing home resident.



REVIEW OF SYSTEMS:  Unable to obtain, patient is confused.



PHYSICAL EXAMINATION:

GENERAL:  The patient is well-developed, well-nourished, no apparent distress.

VITAL SIGNS:  Temperature 99.2, heart rate 76, blood pressure 131/62,

respirations 20, O2 98%.

HEENT:  Head; normocephalic, atraumatic.

NECK:  Supple.  No mass.

LUNGS:  Clear bilaterally.

HEART:  Regular rhythm.

ABDOMEN:  Soft, nontender.



LABORATORY DATA:  WBC 13.2, H and H 14.2 and 42.2, platelet of 294.  Sodium 139,

potassium 3.6, chloride 103, BUN 15, creatinine 0.8.



ASSESSMENT:  Seizures, generalized weakness, dementia, psychosis,

hypercholesterolemia, leukocytosis.



PLAN:  The patient to be admitted to the telemetry unit.  Seizure precautions

will be initiated.  We will get Neurology consultation.  We will continue the

patient's home medications.  We will monitor the patient's Keppra level.  We

will continue to monitor this patient.





DD: 05/30/2019 18:00

DT: 05/30/2019 18:42

JOB# [id number]  [id number]

## 2019-05-31 LAB
ANION GAP SERPL CALC-SCNC: 10.6 MMOL/L (ref 7–16)
BASOPHILS # BLD AUTO: 0.1 TH/CUMM (ref 0–0.2)
BASOPHILS NFR BLD AUTO: 0.8 % (ref 0–2)
BUN SERPL-MCNC: 13 MG/DL (ref 7–25)
CALCIUM SERPL-MCNC: 8.8 MG/DL (ref 8.6–10.3)
CHLORIDE SERPL-SCNC: 103 MEQ/L (ref 98–107)
CO2 SERPL-SCNC: 28.8 MEQ/L (ref 21–31)
CREAT SERPL-MCNC: 0.7 MG/DL (ref 0.6–1.2)
EOSINOPHIL # BLD AUTO: 0.1 TH/CMM (ref 0.1–0.4)
EOSINOPHIL NFR BLD AUTO: 1.8 % (ref 0–5)
ERYTHROCYTE [DISTWIDTH] IN BLOOD BY AUTOMATED COUNT: 13.6 % (ref 11.5–20)
GLUCOSE SERPL-MCNC: 94 MG/DL (ref 70–105)
HCT VFR BLD CALC: 37.7 % (ref 41–60)
HGB BLD-MCNC: 12.6 GM/DL (ref 12–16)
LYMPHOCYTE AB SER FC-ACNC: 2.3 TH/CMM (ref 1.5–3)
LYMPHOCYTES NFR BLD AUTO: 28.6 % (ref 20–50)
MCH RBC QN AUTO: 29.1 PG (ref 27–31)
MCHC RBC AUTO-ENTMCNC: 33.4 PG (ref 28–36)
MCV RBC AUTO: 87.1 FL (ref 81–100)
MONOCYTES # BLD AUTO: 0.9 TH/CMM (ref 0.3–1)
MONOCYTES NFR BLD AUTO: 11 % (ref 2–10)
NEUTROPHILS # BLD: 4.7 TH/CMM (ref 1.8–8)
NEUTROPHILS NFR BLD AUTO: 57.8 % (ref 40–80)
PLATELET # BLD: 258 TH/CMM (ref 150–400)
PMV BLD AUTO: 8.1 FL
POTASSIUM SERPL-SCNC: 3.4 MEQ/L (ref 3.5–5.1)
RBC # BLD AUTO: 4.33 MIL/CMM (ref 3.8–5.2)
SODIUM SERPL-SCNC: 139 MEQ/L (ref 136–145)
WBC # BLD AUTO: 8.1 TH/CMM (ref 4.8–10.8)

## 2019-05-31 RX ADMIN — ASPIRIN 81 MG SCH MG: 81 TABLET ORAL at 08:21

## 2019-05-31 NOTE — CONSULTATION
DATE OF CONSULTATION:  05/30/2019



NEUROLOGY CONSULT



HISTORY OF PRESENT ILLNESS:  A 79-year-old female with question of possible

seizures.  The patient was difficult to awaken.  Now, she is awake and alert.



PAST MEDICAL HISTORY:  Seizures.  Question of Parkinson's.  Previous stroke. 

COPD, obstructive sleep apnea, hyperlipidemia, diabetes.



ALLERGIES:  SULFA.



MEDICATIONS:  Depakote 500 mg q. 12 hours, lacosamide 200 mg q. 12 hours,

gabapentin 300 mg q. 12 hours, Keppra 1000 mg q. 12 hours, Sinemet 25/100 mg

b.i.d., aspirin 81 mg.



REVIEW OF SYSTEMS:  Twelve point negative except for above.



PHYSICAL EXAMINATION:

VITAL SIGNS:  Temperature 98.9, blood pressure 160/75, pulse is 74.

NECK:  Supple.  No neck bruits.

HEART:  Normal heart sounds.

LUNGS:  Clear.

NEUROLOGIC:  The patient is awake.  She will answer questions.  The patient is

confused.  Pupils react to light.  No facial weakness.

MOTOR:  She will lift both arms up.  Increased tone, somewhat more on the right

than the left.  The patient reflex about 1.  Legs are weak, just were able to

lift them, but very weak.  Increased tone.



ASSESSMENT:

1.  Seizure, questionable.  I would continue present medication.  The patient

needs to be continued on lacosamide, Keppra, gabapentin.  I understand that

Depakote has been stopped.

2.  Question of Parkinson's.  The patient to continue present Sinemet dose.

3.  Dementia.

4.  Psychosis.

5.  Hyperlipidemia.

6.  Obstructive sleep apnea.





DD: 05/30/2019 19:37

DT: 05/31/2019 17:54

[facility]# [id number]  [id number]

## 2019-05-31 NOTE — INTERNAL MEDICINE PROG NOTE
Internal Medicine Subjective





- Subjective


Service Date: 05/31/19


Patient seen and examined:: with staff


Patient is:: awake


Patient Complaints of:: other (Admitted for possible seizures.)


Per staff patient has:: no adverse event, no episodes of fall





Internal Medicine Objective





- Results


Result Diagrams: 


 05/31/19 06:00





 05/31/19 06:00


Recent Labs: 


 Laboratory Last Values











WBC  8.1 Th/cmm (4.8-10.8)  D 05/31/19  06:00    


 


RBC  4.33 Mil/cmm (3.80-5.20)   05/31/19  06:00    


 


Hgb  12.6 gm/dL (12-16)   05/31/19  06:00    


 


Hct  37.7 % (41.0-60)  L  05/31/19  06:00    


 


MCV  87.1 fl ()   05/31/19  06:00    


 


MCH  29.1 pg (27.0-31.0)   05/31/19  06:00    


 


MCHC Differential  33.4 pg (28.0-36.0)   05/31/19  06:00    


 


RDW  13.6 % (11.5-20.0)   05/31/19  06:00    


 


Plt Count  258 Th/cmm (150-400)   05/31/19  06:00    


 


MPV  8.1 fl  05/31/19  06:00    


 


Add Manual Diff  YES   05/30/19  06:40    


 


Neutrophils %  57.8 % (40.0-80.0)   05/31/19  06:00    


 


Band Neutrophils %  2 % (0-10)   05/30/19  06:40    


 


Lymphocytes %  28.6 % (20.0-50.0)   05/31/19  06:00    


 


Monocytes %  11.0 % (2.0-10.0)  H  05/31/19  06:00    


 


Eosinophils %  1.8 % (0.0-5.0)   05/31/19  06:00    


 


Basophils %  0.8 % (0.0-2.0)   05/31/19  06:00    


 


Neutrophils (Manual)  80 % (40-80)   05/30/19  06:40    


 


Lymphocytes  14 % (20-50)  L  05/30/19  06:40    


 


Monocytes  4 % (2-10)   05/30/19  06:40    


 


Eosinophils  0 % (0-5)   05/30/19  06:40    


 


Basophils  0 % (0-3)   05/30/19  06:40    


 


Nucleated RBCs   % (0-0)   05/30/19  06:40    


 


Sodium  139 mEq/L (136-145)   05/31/19  06:00    


 


Potassium  3.4 mEq/L (3.5-5.1)  L  05/31/19  06:00    


 


Chloride  103 mEq/L ()   05/31/19  06:00    


 


Carbon Dioxide  28.8 mEq/L (21.0-31.0)   05/31/19  06:00    


 


Anion Gap  10.6  (7.0-16.0)   05/31/19  06:00    


 


BUN  13 mg/dL (7-25)   05/31/19  06:00    


 


Creatinine  0.7 mg/dL (0.6-1.2)   05/31/19  06:00    


 


Est GFR ( Amer)  TNP   05/31/19  06:00    


 


Est GFR (Non-Af Amer)  TNP   05/31/19  06:00    


 


BUN/Creatinine Ratio  18.6   05/31/19  06:00    


 


Glucose  94 mg/dL ()   05/31/19  06:00    


 


Calcium  8.8 mg/dL (8.6-10.3)   05/31/19  06:00    


 


Total Bilirubin  0.4 mg/dL (0.3-1.0)   05/30/19  06:40    


 


AST  14 U/L (13-39)   05/30/19  06:40    


 


ALT  15 U/L (7-52)   05/30/19  06:40    


 


Alkaline Phosphatase  70 U/L ()   05/30/19  06:40    


 


Creatine Kinase  50 U/L ()   05/29/19  19:49    


 


Troponin I  0.01 ng/mL (0.01-0.05)   05/29/19  19:49    


 


Total Protein  6.2 gm/dL (6.0-8.3)   05/30/19  06:40    


 


Albumin  3.7 gm/dL (3.7-5.3)   05/30/19  06:40    


 


Globulin  2.5 gm/dL  05/30/19  06:40    


 


Albumin/Globulin Ratio  1.5  (1.0-1.8)   05/30/19  06:40    


 


TSH  1.62 uIU/ml (0.34-5.60)   05/29/19  19:49    


 


Valproic Acid  < 10.0 ug/mL (50.0-100.0)  L  05/30/19  06:40    














- Physical Exam


Vitals and I&O: 


 Vital Signs











Temp  97.1 F   05/31/19 08:00


 


Pulse  72   05/31/19 08:00


 


Resp  18   05/31/19 10:18


 


BP  116/51   05/31/19 08:00


 


Pulse Ox  93   05/31/19 08:00








 Intake & Output











 05/30/19 05/31/19 05/31/19





 18:59 06:59 18:59


 


Intake Total 60  


 


Output Total  2 


 


Balance 60 -2 


 


Weight (lbs) 108.862 kg 108.862 kg 


 


Intake:   


 


  Oral 60  


 


Output:   


 


  Urine  2 


 


Other:   


 


  # Voids 2  


 


  # Bowel Movements 1 0 


 


  Stool Characteristics Soft  


 


  Weight Source Bedscale Bedscale 











Active Medications: 


Current Medications





Artificial Tears (Artificial Tears Ophth Soln)  1 drop EACH EYE TID PRN


   PRN Reason: DRY EYES


   Stop: 07/29/19 07:36


Aspirin (Aspirin Chewable)  81 mg PO DAILY [facility]


   Stop: 07/29/19 08:59


   Last Admin: 05/31/19 08:21 Dose:  81 mg


Atorvastatin Calcium (Lipitor)  10 mg PO HS [facility]URE[facility]; Protocol


   Stop: 07/29/19 20:59


   Last Admin: 05/30/19 20:57 Dose:  10 mg


Carbidopa/Levodopa (Sinemet 25mg-100 Mg)  1 tab PO BID [facility]


   Stop: 07/29/19 08:59


   Last Admin: 05/31/19 08:21 Dose:  1 tab


Cholecalciferol (Vitamin D3)  1,000 iu PO DAILY [facility]


   Stop: 07/29/19 08:59


   Last Admin: 05/31/19 08:21 Dose:  1,000 iu


Gabapentin (Neurontin)  300 mg PO Q12HR [facility]


   Stop: 07/29/19 20:59


   Last Admin: 05/31/19 08:21 Dose:  300 mg


Lacosamide (Vimpat)  200 mg PO Q12H [facility]


   Stop: 07/30/19 08:59


   Last Admin: 05/31/19 08:21 Dose:  200 mg


Levetiracetam (Keppra)  1,000 mg PO Q12HR [facility]


   Stop: 07/29/19 20:59


   Last Admin: 05/31/19 08:21 Dose:  1,000 mg


Lorazepam (Ativan)  0.5 mg IVP Q4H PRN; Protocol


   PRN Reason: Seizure


   Stop: 07/28/19 22:14


Nystatin (Mycostatin Cream)  1 appl TP DAILY PRN


   PRN Reason: Itching


   Stop: 07/30/19 09:15








Physical Exam: 





78 y/o female patient was admitted due to possible seizures, We are monitoring 

patient.


General: weak, demented


HEENT: NC/AT


Neck: Supple


Lungs: CTAB


Cardiovascular: RRR


Abdomen: soft, non-tender


Extremities: clear


Neurological: no change





- Procedures


Procedures: 


 Procedures











Procedure Code Date


 


INS PICC 5 YR+ W/O IMAGING 47870 05/13/15


 


VENOUS CATHETERIZATION NEC 38.93 05/13/15














Internal Medicine Assmt/Plan





- Assessment


Assessment: 





R/o Seizures.


Hx of Hypercholesterolemia.


Dementia.


Hx of Psychosis.








- Plan


Plan: 





Continuation of care. 


Monitor Vitals and Labs.


Continue present meds as directed.


Monitor vitals, continue B/P meds.


Monitor Diet/Nutritional support. 


Psych management per Psych.


Seizure precaution.


Safety precaution.


Supportive care. 


Fall precaution, frequent nursing rounds, and as needed restraints to prevent 

fall.


Continue collaborating with consulting specialists, case management and nursing 

team.


Will Monitor patient and continue current treatment plan as ordered.








Nutritional Asmnt/Malnutr-PDOC





- Dietary Evaluation


Malnutrition Findings (Please click <Entered> for more info): 





see orders.

## 2019-06-01 LAB
ANION GAP SERPL CALC-SCNC: 11.4 MMOL/L (ref 7–16)
BUN SERPL-MCNC: 12 MG/DL (ref 7–25)
CALCIUM SERPL-MCNC: 8.8 MG/DL (ref 8.6–10.3)
CHLORIDE SERPL-SCNC: 104 MEQ/L (ref 98–107)
CO2 SERPL-SCNC: 29.5 MEQ/L (ref 21–31)
CREAT SERPL-MCNC: 0.8 MG/DL (ref 0.6–1.2)
GLUCOSE SERPL-MCNC: 90 MG/DL (ref 70–105)
POTASSIUM SERPL-SCNC: 3.9 MEQ/L (ref 3.5–5.1)
SODIUM SERPL-SCNC: 141 MEQ/L (ref 136–145)

## 2019-06-01 RX ADMIN — ASPIRIN 81 MG SCH MG: 81 TABLET ORAL at 08:27

## 2019-06-01 NOTE — PROGRESS NOTES
DATE:  05/31/2019



SUBJECTIVE:  The patient is in bed.  Awake.  No repeat seizures.  Still patient

is somewhat confused and at times gets agitated.



MEDICATIONS:  Aspirin 81 mg, atorvastatin, Sinemet 25/100 mg b.i.d., gabapentin

300 mg q. 12 hours, lacosamide 200 mg q. 12 hours, Keppra 1000 mg q. 12 hours,

lorazepam 0.5 mg p.r.n.



OBJECTIVE:

VITAL SIGNS:  Temperature 97.7, blood pressure 134/56, pulse is 80.

NECK:  Supple, no bruits.

CARDIOVASCULAR:  Heart sounds S1, S2.

LUNGS:  Clear.

NEUROLOGIC:  The patient is awake, alert.  The patient will answer question, but

gets agitated very quickly.  Motor, she will lift arms, but weak to be more on

the right.  Increased tone.



ASSESSMENT:

1.  Seizures.

2.  Parkinson's.

3.  Dementia.

4.  Psychosis.

5.  Hyperlipidemia.



PLAN:  Continue present treatment.  Continue present seizures medication.





DD: 05/31/2019 08:07

DT: 06/01/2019 03:49

JOB# [id number]  [id number]

## 2019-06-02 NOTE — PROGRESS NOTES
DATE:  06/01/2019



SUBJECTIVE:  The patient was seen in her room.  The patient is awake, alert and

episodes of confusion and occasional agitation, but otherwise the patient

appears to be comfortable, in no acute distress.



OBJECTIVE:

VITAL SIGNS:  Temperature 97, heart rate 76, blood pressure 117/50, respiration

18, 94% on room air.

HEENT:  Head is atraumatic and normocephalic.  Eyes:  Bilateral conjunctivae are

clear.  Bilateral pupils are equally round and reactive.

NECK:  Supple.  No JVD.

CARDIOVASCULAR:  S1 and S2, without murmur.

PULMONARY:  Clear to auscultation.

GASTROINTESTINAL:  Soft and nontender without guarding.  Positive bowel sounds.

MUSCULOSKELETAL:  No clubbing.  No cyanosis noted.



ASSESSMENT:

1.  Seizure disorder.

2.  Parkinson disease.

3.  Dementia.

4.  Psychosis.

5.  Hyperlipidemia.



PLAN:  We will continue current treatment.  The patient medically cleared for

discharge, going to [facility].  Treatment plans

were discussed with the patient's nurse.  Treatment plans were discussed with

Dr. Hurtado.





DD: 06/01/2019 05:44

DT: 06/02/2019 02:07

JOB# [id number]  [id number]

## 2023-03-15 NOTE — GENERAL PROGRESS NOTE
Subjective





- Review of Systems


Subjective: 





Patient is seen and examined.  Patient is more alert awake and oriented.  

Consultant's note reviewed.  Patient denies any chest pain, shortness of breath

, palpitation, dizziness, headache.





Objective





- Results


Result Diagrams: 


 09/02/17 07:16





 09/02/17 07:16


Recent Labs: 


 Laboratory Last Values











WBC  12.2 Th/cmm (4.8-10.8)  H  09/02/17  07:16    


 


RBC  4.11 Mil/cmm (3.80-5.20)   09/02/17  07:16    


 


Hgb  12.4 gm/dL (11.7-16.1)  D 09/02/17  07:16    


 


Hct  37.6 % (35.0-45.0)  D 09/02/17  07:16    


 


MCV  91.3 fl ()   09/02/17  07:16    


 


MCH  30.2 pg (27.0-31.0)   09/02/17  07:16    


 


MCHC Differential  33.1 pg (28.0-36.0)   09/02/17  07:16    


 


RDW  12.5 % (11.5-20.0)   09/02/17  07:16    


 


Plt Count  221 Th/cmm (150-400)   09/02/17  07:16    


 


MPV  8.3 fl  09/02/17  07:16    


 


Neutrophils %  84.9 % (40.0-80.0)  H  09/01/17  10:00    


 


Lymphocytes %  10.3 % (20.0-50.0)  L  09/01/17  10:00    


 


Monocytes %  3.0 % (2.0-10.0)   09/01/17  10:00    


 


Eosinophils %  0.5 % (0.0-5.0)   09/01/17  10:00    


 


Basophils %  1.3 % (0.0-2.0)   09/01/17  10:00    


 


Neutrophils (Manual)  81 % (40-80)  H  09/02/17  07:16    


 


Lymphocytes  11 % (20-50)  L  09/02/17  07:16    


 


Monocytes  8 % (2-10)   09/02/17  07:16    


 


Platelet Estimate  ADEQUATE  (NORMAL)   09/02/17  07:16    


 


Platelet Morphology  NORMAL  (NORMAL)   09/02/17  07:16    


 


RBC Morph Micro Appear  NORMAL  (NORMAL)   09/02/17  07:16    


 


Specimen Source  Arterial   09/01/17  10:10    


 


Sample Site  Right Radial   09/01/17  10:10    


 


pH  7.36  (7.35-7.45)   09/01/17  10:10    


 


pCO2  56.0 mmHg (35.0-45.0)  H*  09/01/17  10:10    


 


pO2  48.0 mmHg (80.0-100.0)  L*  09/01/17  10:10    


 


HCO3  28.3 mEq/L (20.0-26.0)  H  09/01/17  10:10    


 


Base Excess  4.8 mEq/L (-3.0-3.0)  H  09/01/17  10:10    


 


O2 Saturation  82.0 % (92.0-100.0)  L  09/01/17  10:10    


 


Ariel Test  POSITIVE   09/01/17  10:10    


 


Vent Rate  N/A   09/01/17  10:10    


 


Inspired O2  21   09/01/17  10:10    


 


Tidal Volume  N/A   09/01/17  10:10    


 


PEEP  N/A   09/01/17  10:10    


 


Pressure (ins/psv/peep)  N/A   09/01/17  10:10    


 


Critical Value  ISAIAH   09/01/17  10:10    


 


Sodium  137 mEq/L (136-145)   09/02/17  07:16    


 


Potassium  3.9 mEq/L (3.5-5.1)   09/02/17  07:16    


 


Chloride  103 mEq/L ()   09/02/17  07:16    


 


Carbon Dioxide  31.3 mEq/L (21.0-31.0)  H  09/02/17  07:16    


 


Anion Gap  6.6  (7.0-16.0)  L  09/02/17  07:16    


 


BUN  11 mg/dL (7-25)   09/02/17  07:16    


 


Creatinine  0.7 mg/dL (0.6-1.2)   09/02/17  07:16    


 


Est GFR ( Amer)  TNP   09/02/17  07:16    


 


Est GFR (Non-Af Amer)  TNP   09/02/17  07:16    


 


BUN/Creatinine Ratio  15.7   09/02/17  07:16    


 


Glucose  105 mg/dL ()   09/02/17  07:16    


 


Calcium  8.7 mg/dL (8.6-10.3)   09/02/17  07:16    


 


Total Bilirubin  0.5 mg/dL (0.3-1.0)   09/02/17  07:16    


 


AST  12 U/L (13-39)  L  09/02/17  07:16    


 


ALT  10 U/L (7-52)   09/02/17  07:16    


 


Alkaline Phosphatase  55 U/L ()   09/02/17  07:16    


 


Ammonia  78 umol/L (16-53)  H  09/01/17  17:35    


 


Troponin I  0.07 ng/mL (0.01-0.05)  H*  09/02/17  07:16    


 


Total Protein  5.3 gm/dL (6.0-8.3)  L  09/02/17  07:16    


 


Albumin  3.2 gm/dL (3.7-5.3)  L  09/02/17  07:16    


 


Globulin  2.1 gm/dL  09/02/17  07:16    


 


Albumin/Globulin Ratio  1.5  (1.0-1.8)   09/02/17  07:16    


 


Triglycerides  78 mg/dL (<150)   09/02/17  07:16    


 


Cholesterol  144 mg/dL (<200)   09/02/17  07:16    


 


LDL Cholesterol Direct  85 mg/dL ()   09/02/17  07:16    


 


HDL Cholesterol  40 mg/dL (23-92)   09/02/17  07:16    


 


Valproic Acid  < 10.0 ug/mL (50.0-100.0)  L  09/01/17  10:00    














- Physical Exam


Vitals and I&O: 


 Vital Signs











Temp  97.9 F   09/02/17 12:00


 


Pulse  102   09/02/17 14:00


 


Resp  17   09/02/17 14:00


 


BP  129/70   09/02/17 14:00


 


Pulse Ox  96   09/02/17 14:00








 Intake & Output











 09/01/17 09/02/17 09/02/17





 18:59 06:59 18:59


 


Intake Total  320 964.167


 


Output Total  600 


 


Balance  -280 964.167


 


Weight (lbs)  120.287 kg 


 


Intake:   


 


  Intake, IV Amount  320 964.167


 


    D5-0.45NS 1,000 ml @ 50   964.167





    mls/hr IV .Q20H [facility] Rx#:   





    [id number]   


 


    Levetiracetam 1000mg/  100 





    100mL 1,000 mg In 100 ml   





    @ 400 mls/hr IV Q12H [facility]   





    Rx#:[id number]   


 


    Valproate Sodium 1,000 mg  220 





    In Sodium Chloride 0.9%   





    100 ml @ 100 mls/hr IV   





    Q12H [facility] Rx#:[id number]   


 


  Oral  0 


 


Output:   


 


  Urine  400 


 


  Stool  200 


 


Other:   


 


  # Bowel Movements  1 


 


  Stool Characteristics Formed  





 Hard  





 Brown  











Active Medications: 


Current Medications





Acetaminophen (Tylenol 650mg Supp)  650 mg RC Q6H PRN


   PRN Reason: Fever > 101


   Stop: 10/31/17 15:39


Acetaminophen (Tylenol)  650 mg PO Q6HR PRN


   PRN Reason: Fever >100.5


   Stop: 10/31/17 17:11


Acetaminophen/Hydrocodone Bitart (Norco 5mg/325mg)  1 tab PO Q6HR PRN


   PRN Reason: Pain


   Stop: 10/31/17 17:11


Al Hydrox/Mg Hydrox/Simethicone (Maalox)  30 ml PO Q4HR PRN


   PRN Reason: Gi upset


Albuterol/Ipratropium (Duoneb Neb)  3 ml HHN Q6HRT [facility]


   Stop: 10/31/17 18:59


   Last Admin: 09/02/17 12:09 Dose:  3 ml


Atorvastatin Calcium (Lipitor)  10 mg PO HS [facility]


   PRN Reason: Protocol


   Stop: 10/31/17 20:59


   Last Admin: 09/01/17 20:44 Dose:  Not Given


Docusate Sodium (Colace)  250 mg PO DAILY [facility]


   Stop: 11/01/17 08:59


   Last Admin: 09/02/17 10:43 Dose:  250 mg


Valproate Sodium 1,000 mg/ (Sodium Chloride)  110 mls @ 100 mls/hr IV Q12H [facility]


   Stop: 10/31/17 15:59


   Last Infusion: 09/02/17 05:13 Dose:  Infused


Levetiracetam (Keppra Pb)  1,000 mg in 100 mls @ 400 mls/hr IV Q12H [facility]


   Stop: 11/01/17 03:59


   Last Admin: 09/02/17 15:58 Dose:  100 mls/hr


Dextrose/Sodium Chloride (D5-0.45ns)  1,000 mls @ 50 mls/hr IV .Q20H [facility]


   Stop: 10/31/17 20:28


   Last Admin: 09/02/17 15:59 Dose:  50 mls/hr


Diltiazem HCl 125 mg/ Dextrose  125 mls @ 10 mls/hr IV TITR [facility]; 10 MG/HR


   PRN Reason: Protocol


   Stop: 10/31/17 20:30


Lorazepam (Ativan)  1 mg IVP Q1H PRN; Protocol


   PRN Reason: FOR SEIZURE


   Stop: 10/31/17 20:07


   Last Admin: 09/02/17 02:02 Dose:  1 mg


Metoprolol Tartrate (Lopressor)  50 mg PO DAILY [facility]


   Stop: 11/01/17 08:59


   Last Admin: 09/02/17 09:00 Dose:  Not Given


Miscellaneous (Calcium/Cholecalciferol/Sodi [Calcium With Vitamin D 600 Mg-400 

Iu-5 Mg])  1 tab PO DAILY [facility]


   Stop: 11/01/17 08:59


Nystatin (Nystop)  100 units TP BID [facility]


   Stop: 11/01/17 08:59


   Last Admin: 09/02/17 10:45 Dose:  100 units








General: Alert, Cooperative


HEENT: Atraumatic, PERRLA, EOMI


Neck: Supple, Other (no JVD, no thyromegaly, no lymphadenopathy.)


Cardiovascular: Regular rate, Normal S1, Normal S2


Lungs: Clear to auscultation, Normal air movement


Abdomen: Bowel sounds, Soft, Tender


Extremities: Other (bilateral foot drop noted)


Neurological: Other (alert awake follows, and mowing upper extremity without 

difficulty lower extremity weakness reported.)


Psych/Mental Status: Mental status NL





- Procedures


Procedures: 


 Procedures











Procedure Code Date


 


INSERT PICC CATH 71400 05/13/15


 


VENOUS CATHETERIZATION NEC 38.93 05/13/15














Assessment/Plan





- Problem List


Patient Problems: 


All Active Problems





Hyperglycemia (Acute) R73.9


Hypopotassemia (Acute) E87.6


Morbid obesity (Acute) E66.01


Status epilepticus (Acute) G40.901











- Assessment


Assessment: 





Altered mental status secondary to breakthrough seizure.


Rule out CVA.


Hypertension


Hyperlipidemia


Diffuse DJD


Psychiatric disorder


GERD


Bilateral lower extremity weakness and foot drop.


Seizure disorder








- Plan


Plan: 





Transfer her to [facility] floor.


IV Keppra to by mouth Keppra.


IV Depacon to by mouth Depakote.


When necessary IV Ativan.


Seizure precaution.


General nursing care.


Nutritional support


Chronic management of her illness as ordered.


Follow lab.


Follow consultants recommendations


Care plan reviewed and discussed with RN.
Subjective





- Review of Systems


Subjective: 





Patient is seen and examined. Patient denies any chest pain, shortness of breath

, palpitation, dizziness, headache.  Now patient is in room 15 bed 1.  Patient 

is eating her own meal.  No new events reported by nursing staff.





Objective





- Results


Result Diagrams: 


 17 07:16





 17 07:16


Recent Labs: 


 Laboratory Last Values











WBC  12.2 Th/cmm (4.8-10.8)  H  17  07:16    


 


RBC  4.11 Mil/cmm (3.80-5.20)   17  07:16    


 


Hgb  12.4 gm/dL (11.7-16.1)  D 17  07:16    


 


Hct  37.6 % (35.0-45.0)  D 17  07:16    


 


MCV  91.3 fl ()   17  07:16    


 


MCH  30.2 pg (27.0-31.0)   17  07:16    


 


MCHC Differential  33.1 pg (28.0-36.0)   17  07:16    


 


RDW  12.5 % (11.5-20.0)   17  07:16    


 


Plt Count  221 Th/cmm (150-400)   17  07:16    


 


MPV  8.3 fl  17  07:16    


 


Neutrophils %  84.9 % (40.0-80.0)  H  17  10:00    


 


Lymphocytes %  10.3 % (20.0-50.0)  L  17  10:00    


 


Monocytes %  3.0 % (2.0-10.0)   17  10:00    


 


Eosinophils %  0.5 % (0.0-5.0)   17  10:00    


 


Basophils %  1.3 % (0.0-2.0)   17  10:00    


 


Neutrophils (Manual)  81 % (40-80)  H  17  07:16    


 


Lymphocytes  11 % (20-50)  L  17  07:16    


 


Monocytes  8 % (2-10)   17  07:16    


 


Platelet Estimate  ADEQUATE  (NORMAL)   17  07:16    


 


Platelet Morphology  NORMAL  (NORMAL)   17  07:16    


 


RBC Morph Micro Appear  NORMAL  (NORMAL)   17  07:16    


 


Specimen Source  Arterial   17  10:10    


 


Sample Site  Right Radial   17  10:10    


 


pH  7.36  (7.35-7.45)   17  10:10    


 


pCO2  56.0 mmHg (35.0-45.0)  H*  17  10:10    


 


pO2  48.0 mmHg (80.0-100.0)  L*  17  10:10    


 


HCO3  28.3 mEq/L (20.0-26.0)  H  17  10:10    


 


Base Excess  4.8 mEq/L (-3.0-3.0)  H  17  10:10    


 


O2 Saturation  82.0 % (92.0-100.0)  L  17  10:10    


 


Ariel Test  POSITIVE   17  10:10    


 


Vent Rate  N/A   17  10:10    


 


Inspired O2  21   17  10:10    


 


Tidal Volume  N/A   17  10:10    


 


PEEP  N/A   17  10:10    


 


Pressure (ins/psv/peep)  N/A   17  10:10    


 


Critical Value  ISAIAH   17  10:10    


 


Sodium  137 mEq/L (136-145)   17  07:16    


 


Potassium  3.9 mEq/L (3.5-5.1)   17  07:16    


 


Chloride  103 mEq/L ()   17  07:16    


 


Carbon Dioxide  31.3 mEq/L (21.0-31.0)  H  17  07:16    


 


Anion Gap  6.6  (7.0-16.0)  L  17  07:16    


 


BUN  11 mg/dL (7-25)   17  07:16    


 


Creatinine  0.7 mg/dL (0.6-1.2)   17  07:16    


 


Est GFR ( Amer)  TNP   17  07:16    


 


Est GFR (Non-Af Amer)  TNP   17  07:16    


 


BUN/Creatinine Ratio  15.7   17  07:16    


 


Glucose  105 mg/dL ()   17  07:16    


 


Calcium  8.7 mg/dL (8.6-10.3)   17  07:16    


 


Total Bilirubin  0.5 mg/dL (0.3-1.0)   17  07:16    


 


AST  12 U/L (13-39)  L  17  07:16    


 


ALT  10 U/L (7-52)   17  07:16    


 


Alkaline Phosphatase  55 U/L ()   17  07:16    


 


Ammonia  78 umol/L (16-53)  H  17  17:35    


 


Troponin I  0.07 ng/mL (0.01-0.05)  H*  17  07:16    


 


Total Protein  5.3 gm/dL (6.0-8.3)  L  17  07:16    


 


Albumin  3.2 gm/dL (3.7-5.3)  L  17  07:16    


 


Globulin  2.1 gm/dL  17  07:16    


 


Albumin/Globulin Ratio  1.5  (1.0-1.8)   17  07:16    


 


Triglycerides  78 mg/dL (<150)   17  07:16    


 


Cholesterol  144 mg/dL (<200)   17  07:16    


 


LDL Cholesterol Direct  85 mg/dL ()   17  07:16    


 


HDL Cholesterol  40 mg/dL (23-92)   17  07:16    


 


Valproic Acid  < 10.0 ug/mL (50.0-100.0)  L  17  10:00    














- Physical Exam


Vitals and I&O: 


 Vital Signs











Temp  96.5 F   17 04:00


 


Pulse  86   17 09:07


 


Resp  20   17 07:31


 


BP  134/71   17 09:07


 


Pulse Ox  94   17 07:30








 Intake & Output











 17





 18:59 06:59 18:59


 


Intake Total 599.167 200 


 


Output Total 1230 600 


 


Balance -630.833 -400 


 


Weight (lbs) 119.567 kg 119.249 kg 


 


Intake:   


 


  Intake, IV Amount 299.167  


 


    D5-0.45NS 1,000 ml @ 50 299.167  





    mls/hr IV .Q20H [facility] Rx#:   





    [id number]   


 


  Oral 300 200 


 


Output:   


 


  Urine 1230 600 


 


Other:   


 


  # Bowel Movements 1 0 


 


  Stool Characteristics Soft  





 Formed  





 Brown  











Active Medications: 


Current Medications





Acetaminophen (Tylenol 650mg Supp)  650 mg RC Q6H PRN


   PRN Reason: Fever > 101


   Stop: 10/31/17 15:39


Acetaminophen (Tylenol)  650 mg PO Q6HR PRN


   PRN Reason: Fever >100.5


   Stop: 10/31/17 17:11


Acetaminophen/Hydrocodone Bitart (Norco 5mg/325mg)  1 tab PO Q6HR PRN


   PRN Reason: Pain


   Stop: 10/31/17 17:11


Al Hydrox/Mg Hydrox/Simethicone (Maalox)  30 ml PO Q4HR PRN


   PRN Reason: Gi upset


Albuterol/Ipratropium (Duoneb Neb)  3 ml HHN Q6HRT [facility]


   Stop: 10/31/17 18:59


   Last Admin: 17 07:23 Dose:  3 ml


Atorvastatin Calcium (Lipitor)  10 mg PO HS [facility]


   PRN Reason: Protocol


   Stop: 10/31/17 20:59


   Last Admin: 17 21:42 Dose:  10 mg


Calcium/Vitamin D (Oscal W/Vitamin D)  1 tab PO DAILY [facility]


   Stop: 17 09:29


   Last Admin: 17 09:38 Dose:  Not Given


Divalproex Sodium (Depakote Sprinkle)  500 mg PO Q8HR [facility]


   PRN Reason: Protocol


   Stop: 17 20:59


   Last Admin: 17 06:22 Dose:  500 mg


Docusate Sodium (Colace)  250 mg PO DAILY [facility]


   Stop: 17 08:59


   Last Admin: 17 09:07 Dose:  Not Given


Dextrose/Sodium Chloride (D5-0.45ns)  1,000 mls @ 50 mls/hr IV .Q20H [facility]


   Stop: 10/31/17 20:28


   Last Admin: 17 18:21 Dose:  50 mls/hr


Diltiazem HCl 125 mg/ Dextrose  125 mls @ 10 mls/hr IV TITR [facility]; 10 MG/HR


   PRN Reason: Protocol


   Stop: 10/31/17 20:30


Levetiracetam (Keppra)  1,000 mg PO BID [facility]


   Stop: 17 16:59


   Last Admin: 17 09:06 Dose:  1,000 mg


Lorazepam (Ativan)  1 mg IVP Q1H PRN; Protocol


   PRN Reason: FOR SEIZURE


   Stop: 10/31/17 20:07


   Last Admin: 17 02:02 Dose:  1 mg


Metoprolol Tartrate (Lopressor)  50 mg PO DAILY [facility]


   Stop: 17 08:59


   Last Admin: 17 09:07 Dose:  Not Given


Nystatin (Nystop)  100 units TP BID [facility]


   Stop: 17 08:59


   Last Admin: 17 09:07 Dose:  Not Given








General: Alert, Cooperative


HEENT: Atraumatic, PERRLA, EOMI


Neck: Supple, Other (no JVD, no thyromegaly, no lymphadenopathy.)


Cardiovascular: Regular rate, Normal S1, Normal S2


Lungs: Clear to auscultation, Normal air movement


Abdomen: Bowel sounds, Soft, Tender


Extremities: Other (bilateral foot drop noted)


Neurological: Other (alert oriented to time place person, decrease lower 

extremity weakness.)


Psych/Mental Status: Mental status NL





- Procedures


Procedures: 


 Procedures











Procedure Code Date


 


INSERT PICC CATH 83124 05/13/15


 


VENOUS CATHETERIZATION NEC 38.93 05/13/15














Assessment/Plan





- Problem List


Patient Problems: 


All Active Problems





Hyperglycemia (Acute) R73.9


Hypopotassemia (Acute) E87.6


Morbid obesity (Acute) E66.01


Status epilepticus (Acute) G40.901











- Assessment


Assessment: 





Altered mental status secondary to breakthrough seizure.


Hypertension


Hyperlipidemia


Diffuse DJD


Supraventricular tachycardia


Psychiatric disorder


GERD


Bilateral lower extremity weakness and foot drop.


Seizure disorder








- Plan


Plan: 


Discharged to the nursing home with continuation of same medication as patient 

is receiving.  the discharge summary dictated..





Nutritional Asmnt/Malnutr-PDOC





- Dietary Evaluation


Malnutrition Findings (Please click <Entered> for more info): 








Nutritional Asmnt/Malnutrition                             Start:  17 12:

10


Text:                                                      Status: Active      

  


Freq:                                                                          

  


 Document     17 12:10  MMULHERN  (Rec: 17 12:22  MMULKULWINDER COBOS-

FN)


 Nutritional Asmnt/Malnutrition


     Patient General Information


      Nutritional Screening                      Consult


      Diagnosis                                  Seizure


      Pertinent Medical Hx/Surgical Hx           COPD, Seizure, hypertension,


                                                 obesity, GERD, hyper ammonia


                                                 level, Psychotic disorder,


                                                 Urinary tract infection


      Subjective Information                     Consult recieved for Gideon


                                                 below 12. Patient was admitted


                                                 from [facility] for altered mental status


                                                 with seizure.


      Current Diet Order/ Nutrition Support      Mechanical soft chopped, low


                                                 fat, NIMA NCS


      Patient / S.O                              Not Indicated


      Pertinent Medications                      Maalox, lipitor, Oscal, D5-0.


                                                 45NS @50ml/hr, colace


      Pertinent Labs                             Albumin 3.2


     Nutritional Hx/Data


      Height                                     1.57 m


      Height (Calculated Centimeters)            157.5


      Current Weight (lbs)                       118.841 kg


      Weight (Calculated Kilograms)              118.8


      Weight (Calculated Grams)                  271715.2


      Ideal Body Weight                          110


      % Ideal Body Weight                        238


      Recent Weight Change                       No


      Weight Status                              Morbidly Obese


     GI Symptoms


      GI Symptoms                                None


      Food Allergies                             No


      Cultural/Ethnic/Scientologist Belief           None indicated


      Usual diet at home                         unknown


      Skin Integrity/Comment:                    Gideon 13,


      Current %PO                                Fair (50-74%)


     Estimated Nutritional Goals


      BEE in Kcals:                              Adj wt of IBW


      Calories/Kcals/Kg                          25-30 kcal/kg (using adj


                                                 weight 67.2kg)


      Kcals Calculated                           1680-2000kcal/day


      Protein:                                   Adj wt of IBW


      Protein g/k-1.2 gm/kg (using adj weight


                                                 67.2kg)


      Protein Calculated                         65-80 gm/day


      Fluid: ml                                  4273-5638 ml/day (30-35 ml/kg


                                                 Adj weight)


     Nutritional Problem


      1. Problem


       Problem                                   Malnutrition related to


       Etiology                                  obesity aeb


       Signs/Symptoms:                           BMI 48.1


     Malnutrition Related to Morbid Obesity


      Malnutrition related to morbid obesity     BMI> or equal to 40


       Query Text:(Any 1 Criteria met)           


     Intervention/Recommendation


      Comments                                   1. Continue mechanical soft


                                                 chopped, low fat, NIMA, NCS


                                                 diet as tolerated by patient.


     Expected Outcomes/Goals


      Expected Outcomes/Goals                    Weight trends toward ideal


                                                 body weight, oral intake to


                                                 meet <75% of estimated needs,


                                                 nutrition related labs normal
yes